# Patient Record
Sex: FEMALE | URBAN - METROPOLITAN AREA
[De-identification: names, ages, dates, MRNs, and addresses within clinical notes are randomized per-mention and may not be internally consistent; named-entity substitution may affect disease eponyms.]

---

## 2019-02-12 ENCOUNTER — APPOINTMENT (RX ONLY)
Dept: URBAN - METROPOLITAN AREA CLINIC 12 | Facility: CLINIC | Age: 34
Setting detail: DERMATOLOGY
End: 2019-02-12

## 2019-02-12 DIAGNOSIS — I83.81 VARICOSE VEINS OF LOWER EXTREMITIES WITH PAIN: ICD-10-CM

## 2019-02-12 DIAGNOSIS — I83.9 ASYMPTOMATIC VARICOSE VEINS OF LOWER EXTREMITIES: ICD-10-CM

## 2019-02-12 PROBLEM — E13.9 OTHER SPECIFIED DIABETES MELLITUS WITHOUT COMPLICATIONS: Status: ACTIVE | Noted: 2019-02-12

## 2019-02-12 PROBLEM — L55.1 SUNBURN OF SECOND DEGREE: Status: ACTIVE | Noted: 2019-02-12

## 2019-02-12 PROBLEM — I83.91 ASYMPTOMATIC VARICOSE VEINS OF RIGHT LOWER EXTREMITY: Status: ACTIVE | Noted: 2019-02-12

## 2019-02-12 PROBLEM — I83.811 VARICOSE VEINS OF RIGHT LOWER EXTREMITY WITH PAIN: Status: ACTIVE | Noted: 2019-02-12

## 2019-02-12 PROBLEM — L29.8 OTHER PRURITUS: Status: ACTIVE | Noted: 2019-02-12

## 2019-02-12 PROBLEM — L70.0 ACNE VULGARIS: Status: ACTIVE | Noted: 2019-02-12

## 2019-02-12 PROCEDURE — ? DOPPLER US

## 2019-02-12 PROCEDURE — ? PRODUCT LINE (GARMENTS)

## 2019-02-12 PROCEDURE — 99202 OFFICE O/P NEW SF 15 MIN: CPT | Mod: 25

## 2019-02-12 PROCEDURE — 93971 EXTREMITY STUDY: CPT

## 2019-02-12 PROCEDURE — ? SCLEROTHERAPY (COSMETIC)

## 2019-02-12 ASSESSMENT — LOCATION SIMPLE DESCRIPTION DERM
LOCATION SIMPLE: RIGHT POPLITEAL SKIN
LOCATION SIMPLE: RIGHT THIGH
LOCATION SIMPLE: RIGHT PRETIBIAL REGION

## 2019-02-12 ASSESSMENT — LOCATION DETAILED DESCRIPTION DERM
LOCATION DETAILED: RIGHT MEDIAL PROXIMAL PRETIBIAL REGION
LOCATION DETAILED: RIGHT ANTERIOR PROXIMAL THIGH
LOCATION DETAILED: RIGHT POPLITEAL SKIN

## 2019-02-12 ASSESSMENT — LOCATION ZONE DERM: LOCATION ZONE: LEG

## 2019-02-12 NOTE — PROCEDURE: DOPPLER US
Left Proximal Gsv Reflux (Sec): no reflux
Use Ssv Or Lsv: SSV
Detail Level: Simple
Ultrasound Used (Optional): Terason 3000

## 2019-02-12 NOTE — PROCEDURE: PRODUCT LINE (GARMENTS)
Product 72 Price (In Dollars - Numeric Only, No Special Characters Or $): 0.00
Product 47 Units: 0
Send Charges To Patient Encounter: Yes
Name Of Product 1: Jobst Compression stockings
Product 1 Price (In Dollars - Numeric Only, No Special Characters Or $): 65
Product 1 Units: 1
Detail Level: Zone

## 2019-02-12 NOTE — PROCEDURE: SCLEROTHERAPY (COSMETIC)
Consent was obtained with risks, benefits, and alternatives discussed for the above procedures.
Sclerosant Volume (Cc): 1
Post-Care Instructions: Compression for 1 night and 4 days is critical to optimize your recovery and results. Compliance with compression helps to prevent blood clots and minimizes pain, swelling, bruising, skin discoloration (staining) and the recurrence of vessels.       \nMaterials to gather for your wound care (all available over the counter)      \nCompression stockings x 2 pairs, 4 x 4 gauze, Comprilan wrap: 8 cm and 10 cm width wrap, Medical adhesive tape       \nCompression and Wound care;  Leg compression for 3 weeks is hilton to your success and safety. Compression at night is only needed the first day. After that, compression is needed only during waking hours.  However, if your leg feels better with compression at night, then you may continue compression at night as tolerated.       \nAfter the sclerotherapy procedure, 2 layers compression will be placed.       \n1. On the skin, folded or flat gauze will cover the treated areas.       \n2. A compression wrap (Comprilan) will be wrapped around your leg over the gauze. Once the compression wrap is in place, a compression stocking will be worn. This two layer  compression (wrap plus stocking) should be worn for the first 24 hours if tolerated.       \n3. After 24 hours, remove all compressions and dressings and just wear the compression stockings only during waking hours.        \nYou will need to wear compression stockings for three weeks after your procedure, unless your physician instructs you otherwise.       \nActivity       \n - It is important NOT to be sitting or lying down for several hours after surgery. You may begin walking immediately after surgery. This is good for you, but take it easy.       \n - For 2 days after treatment: Avoid aerobic exercise, weight training, and all other types of exertion that increase your breathing and pulse rates.       \n - Do not get a tan for one month after sclerotherapy. Tanning increases your risk of skin discoloration.      \nBathing       \nAfter 24 hours, you may shower or bathe in tepid water, but keep the compression stocking on. Avoid immersion in hot tubs.      \nDiscomfort . For pain or discomfort:       \n - You may take acetaminophen (TylenolTM, Extra-Strength TylenolTM or DatrilTM) as directed.       \n - Do not use aspirin, products containing aspirin, or ibuprofen (for example AdvilTM or MotrinTM) for five days after your surgery, unless approved by your physician.       \n - Dietary restrictions Do not drink alcoholic beverages for two days after your sclerotherapy procedure.       \nPossible side effects following sclerotherapy  After sclerotherapy, mild swelling is expected. The injection sites may also become bruised or gray. You may also experience one or more of the following side effects, which almost always go away within one to four months:       \n - Darkening of the injected veins       \n - Brownish staining of the skin       \n - Small clotted vessels under the surface of the skin that you can feel      \n - Bruising of the injection sites       \nWhat to do about bruising  This will resolve within 2-3 weeks. If you wish the bruising to disappear sooner, then applying Arnicare cream (over the counter, health food stores) will help.       \nWhat to do if you feel small clotted vessels under the surface of the skin.      \n - Call us for a follow up appointment. These small clots can be drained through a small nick.       \n - Draining these small clots will help you heal faster and with less discoloration.      \nWhen to contact your physician       \nContact your physician if you experience any of the following:       \n - Treated areas become increasingly sore, tender, red or warm       \n - Acetaminophen does not relieve your discomfort       \n - Injection sites turn black or the skin around the site breaks down       \n - Ulceration of the injection sites       \n - You develop blisters from the tape       \n - You develop significant swelling or pain in the leg       \n - Darkening of large areas of the skin or foot
Price (Use Numbers Only, No Special Characters Or $): 120
Detail Level: Detailed
Disposition: Injection sites were prepped in the [facility].  Injections were done under direct visualization.  There was minimal infiltration and a good take. \\n Dressings were placed.  Patient tolerated the procedure well without any difficult or complication.  Compression stockings were placed postoperatively.  She will wear the hose as directed, call with any problems or questions and f/u in 4-6 weeks for a recheck.
Sclerosant Volume (Cc): 0
Sclerosant Volume (Cc): 5